# Patient Record
Sex: FEMALE | Race: WHITE
[De-identification: names, ages, dates, MRNs, and addresses within clinical notes are randomized per-mention and may not be internally consistent; named-entity substitution may affect disease eponyms.]

---

## 2021-03-26 ENCOUNTER — HOSPITAL ENCOUNTER (OUTPATIENT)
Dept: HOSPITAL 53 - M WHC | Age: 26
End: 2021-03-26
Attending: OBSTETRICS & GYNECOLOGY
Payer: COMMERCIAL

## 2021-03-26 DIAGNOSIS — Z3A.21: ICD-10-CM

## 2021-03-26 DIAGNOSIS — Z34.82: Primary | ICD-10-CM

## 2021-03-26 NOTE — REP
INDICATION:

ANATOMY.



COMPARISON:



01/29/2021.



TECHNIQUE:

Real-time sonographic evaluation of the gravid uterus performed.



FINDINGS:

Estimated gestational age is21 weeks 5 days, EDC 08/01/20[birth year].  Today's measurements

indicate appropriate growth.



Presentation: Breech

Placenta posterior, grade 0, without evidence of placenta previa.

Fetal heart rate is recorded at 158 beats per minute.

Amniotic fluid is subjectively normal.

Closed cervical length is measured at 4.0 cm.



Biometry chart:



BPD: 48 mm, 20 weeks 4 days, 19th percentile.

HC: 176 mm, 20 weeks 0 days, less than 5th percentile

AC: 161 mm, 21 weeks 1 days, 39th percentile

Femur length: 34 mm, 20 weeks 5 days, if 24th percentile

HC to AC ratio: 1.09, normal range 1.05-1.24.



Estimated fetal weight: 382g, 11th percentile.



Fetal anatomy:



Cranium: Grossly normal

Lateral Ventricles/Choroid Plexus: Grossly normal

Posterior Fossa/Cerebellum: Grossly normal

Nose/lips/profile: Grossly normal

Four chamber heart: Grossly normal

Right ventricular outflow tract: Grossly normal

Left ventricular outflow tract: Grossly normal

Left-sided stomach: Grossly normal

Kidneys: Grossly normal

Bladder: Grossly normal

Cord Insertion: Grossly normal

3 vessel cord: Grossly normal

Spine: Grossly normal



IMPRESSION:

Viable single intrauterine gestation as above.





<Electronically signed by Luis Dickens > 03/26/21 [id number]